# Patient Record
Sex: FEMALE
[De-identification: names, ages, dates, MRNs, and addresses within clinical notes are randomized per-mention and may not be internally consistent; named-entity substitution may affect disease eponyms.]

---

## 2023-09-11 ENCOUNTER — NURSE TRIAGE (OUTPATIENT)
Dept: OTHER | Facility: CLINIC | Age: 58
End: 2023-09-11

## 2023-09-11 NOTE — TELEPHONE ENCOUNTER
Location of patient: [place]    Subjective: Caller states [id number] I got a nosebleed and I never get nosebleeds before so me and my  packed it and then it started again 30 minutes ago\"     Current Symptoms: nosebleed on right nostril x 2. Pt did not hold pressure on first nosebleed, packed with tissue. Onset: a few hours ago; sudden, intermittent    Associated Symptoms: NA    Pain Severity: 0/10; N/A; none    Temperature: no fevers     What has been tried: packing with tissue    Recommended disposition: Home Care    Care advice provided, patient verbalizes understanding; denies any other questions or concerns; instructed to call back for any new or worsening symptoms. Home care    This triage is a result of a call to [address] [place] [address]. Please do not respond to the triage nurse through this encounter. Any subsequent communication should be directly with the patient.       Reason for Disposition   [1] Mild-moderate nosebleed AND [2] bleeding stopped now    Protocols used: Nosebleed-ADULT-AH